# Patient Record
Sex: MALE | Race: WHITE | NOT HISPANIC OR LATINO
[De-identification: names, ages, dates, MRNs, and addresses within clinical notes are randomized per-mention and may not be internally consistent; named-entity substitution may affect disease eponyms.]

---

## 2018-02-14 ENCOUNTER — TRANSCRIPTION ENCOUNTER (OUTPATIENT)
Age: 73
End: 2018-02-14

## 2018-02-15 ENCOUNTER — RESULT REVIEW (OUTPATIENT)
Age: 73
End: 2018-02-15

## 2018-02-15 ENCOUNTER — OUTPATIENT (OUTPATIENT)
Dept: OUTPATIENT SERVICES | Facility: HOSPITAL | Age: 73
LOS: 1 days | End: 2018-02-15
Payer: MEDICARE

## 2018-02-15 VITALS
SYSTOLIC BLOOD PRESSURE: 136 MMHG | HEART RATE: 73 BPM | RESPIRATION RATE: 16 BRPM | DIASTOLIC BLOOD PRESSURE: 64 MMHG | OXYGEN SATURATION: 100 % | HEIGHT: 65.5 IN | WEIGHT: 181.66 LBS | TEMPERATURE: 99 F

## 2018-02-15 VITALS
HEART RATE: 69 BPM | SYSTOLIC BLOOD PRESSURE: 136 MMHG | DIASTOLIC BLOOD PRESSURE: 63 MMHG | RESPIRATION RATE: 21 BRPM | OXYGEN SATURATION: 96 %

## 2018-02-15 DIAGNOSIS — Z98.49 CATARACT EXTRACTION STATUS, UNSPECIFIED EYE: Chronic | ICD-10-CM

## 2018-02-15 DIAGNOSIS — Z98.890 OTHER SPECIFIED POSTPROCEDURAL STATES: Chronic | ICD-10-CM

## 2018-02-15 DIAGNOSIS — T85.398A OTHER MECHANICAL COMPLICATION OF OTHER OCULAR PROSTHETIC DEVICES, IMPLANTS AND GRAFTS, INITIAL ENCOUNTER: ICD-10-CM

## 2018-02-15 PROCEDURE — 67036 REMOVAL OF INNER EYE FLUID: CPT | Mod: LT

## 2018-02-15 PROCEDURE — 88300 SURGICAL PATH GROSS: CPT

## 2018-02-15 PROCEDURE — V2632: CPT

## 2018-02-15 PROCEDURE — 88300 SURGICAL PATH GROSS: CPT | Mod: 26

## 2018-02-15 PROCEDURE — 66986 EXCHANGE LENS PROSTHESIS: CPT | Mod: LT

## 2018-02-15 NOTE — ASU DISCHARGE PLAN (ADULT/PEDIATRIC). - PROCEDURE
Left eye pars plana vitrectomy, removal of dislocated lens, implant insertion new sutured poster Intraocular lens implant(IOL)

## 2018-02-15 NOTE — ASU DISCHARGE PLAN (ADULT/PEDIATRIC). - SPECIAL INSTRUCTIONS
LEAVE PATCH AND SHIELD ON  FOLLOW UP TOMORROW IN OFFICE  CALL OFFICE -[phone] IF NEEDED FOR EMERGENCY

## 2018-02-20 LAB — SURGICAL PATHOLOGY FINAL REPORT - CH: SIGNIFICANT CHANGE UP

## 2018-07-13 PROBLEM — I10 ESSENTIAL (PRIMARY) HYPERTENSION: Chronic | Status: ACTIVE | Noted: 2018-02-15

## 2018-07-13 PROBLEM — N40.0 BENIGN PROSTATIC HYPERPLASIA WITHOUT LOWER URINARY TRACT SYMPTOMS: Chronic | Status: ACTIVE | Noted: 2018-02-15

## 2018-07-13 PROBLEM — E78.5 HYPERLIPIDEMIA, UNSPECIFIED: Chronic | Status: ACTIVE | Noted: 2018-02-15

## 2020-05-13 PROBLEM — Z00.00 ENCOUNTER FOR PREVENTIVE HEALTH EXAMINATION: Status: ACTIVE | Noted: 2020-05-13

## 2020-06-24 ENCOUNTER — APPOINTMENT (OUTPATIENT)
Dept: ORTHOPEDIC SURGERY | Facility: CLINIC | Age: 75
End: 2020-06-24
Payer: MEDICARE

## 2020-06-24 VITALS
DIASTOLIC BLOOD PRESSURE: 78 MMHG | BODY MASS INDEX: 28.12 KG/M2 | WEIGHT: 175 LBS | TEMPERATURE: 98.2 F | HEART RATE: 73 BPM | SYSTOLIC BLOOD PRESSURE: 124 MMHG | HEIGHT: 66 IN

## 2020-06-24 DIAGNOSIS — M21.41 FLAT FOOT [PES PLANUS] (ACQUIRED), RIGHT FOOT: ICD-10-CM

## 2020-06-24 DIAGNOSIS — M21.42 FLAT FOOT [PES PLANUS] (ACQUIRED), RIGHT FOOT: ICD-10-CM

## 2020-06-24 DIAGNOSIS — M25.572 PAIN IN LEFT ANKLE AND JOINTS OF LEFT FOOT: ICD-10-CM

## 2020-06-24 DIAGNOSIS — M76.822 POSTERIOR TIBIAL TENDINITIS, LEFT LEG: ICD-10-CM

## 2020-06-24 DIAGNOSIS — M25.472 EFFUSION, LEFT ANKLE: ICD-10-CM

## 2020-06-24 DIAGNOSIS — G89.29 PAIN IN LEFT ANKLE AND JOINTS OF LEFT FOOT: ICD-10-CM

## 2020-06-24 PROCEDURE — 73610 X-RAY EXAM OF ANKLE: CPT | Mod: LT

## 2020-06-24 PROCEDURE — 99204 OFFICE O/P NEW MOD 45 MIN: CPT

## 2020-06-24 NOTE — PHYSICAL EXAM
[de-identified] : \par Left Ankle Physical Examination:\par \par General: Alert and oriented x3.  In no acute distress.  Pleasant in nature with a normal affect.  No apparent respiratory distress. \par Erythema, Warmth, Rubor: Negative\par Swelling: Positive\par \par *Severe flatfoot deformity noted.\par \par ROM:\par 1. Dorsiflexion: 10 degrees\par 2. Plantarflexion: 40 degrees\par 3. Inversion: 10 degrees\par 4. Eversion: 10 degrees\par \par Tenderness to Palpation: \par 1. Lateral Malleolus: Negative\par 2. Medial Malleolus: Negative\par 3. Proximal Fibular Pain: Negative\par 4. Heel Pain: Negative\par 5. Cuboid: Negative\par 6. Navicular: Negative\par 7. Tibiotalar Joint: Negative\par 8. Subtalar Joint: Negative\par 9. Posterior Recess: Negative\par \par Tendon Pain:\par 1. Achilles: Negative\par 2. Peroneals: Negative\par 3. Posterior Tibialis: Positive\par 4. Tibialis Anterior: Negative\par \par Ligament Pain:\par 1. ATFL: [default value]\par 2. CFL: Negative \par 3. PTFL: Negative\par 4. Deltoid Ligaments: Negative\par 5. Lis Franc Ligament: Negative\par \par Stability: \par 1. Anterior Drawer: Negative\par 2. Posterior Drawer: Negative\par \par Strength: 5/5 TA/GS/EHL\par \par Pulses: 2+ DP/PT Pulses\par \par Neuro: Intact motor and sensory\par \par Additional Test:\par 1. Calcaneal Squeeze Test: Negative\par 2. Syndesmosis Squeeze Test: Negative\par  [de-identified] : X-rays of the left ankle taken in office today and reviewed with the patient showed: Old healed fracture distal fibula. Severe flatfoot deformity noted.

## 2020-06-24 NOTE — DISCUSSION/SUMMARY
[de-identified] : Assessment: Left ankle posterior tibial tendon dysfunction.\par \par Plan:\par #1. ASO brace given.\par #2. Over-the-counter orthotics to start out with. Consider custom orthotics in the future.\par #3. Anti-inflammatories prescribed. Patient can use Tylenol as well for pain.\par #4. Physical therapy.\par #5. Hold off on MRI at this time.\par #6. Follow up in 6-8 weeks. If the patient continues to have pain consider MRI then. All of his questions were answered.

## 2020-06-24 NOTE — HISTORY OF PRESENT ILLNESS
[FreeTextEntry1] : Collin is a 74 y.o. who presents with chronic left ankle pain.  Pain located medial ankle.  Patient has severe flat foot deformity.  Pain scale 7/10.  Patient presents wearing regular sneakers.  Patient does not have orthotics.  No other complaints.

## 2020-09-02 ENCOUNTER — NON-APPOINTMENT (OUTPATIENT)
Age: 75
End: 2020-09-02

## 2020-09-02 ENCOUNTER — APPOINTMENT (OUTPATIENT)
Dept: CARDIOLOGY | Facility: CLINIC | Age: 75
End: 2020-09-02
Payer: MEDICARE

## 2020-09-02 VITALS
HEIGHT: 65 IN | SYSTOLIC BLOOD PRESSURE: 120 MMHG | BODY MASS INDEX: 30.16 KG/M2 | RESPIRATION RATE: 14 BRPM | DIASTOLIC BLOOD PRESSURE: 68 MMHG | WEIGHT: 181 LBS | HEART RATE: 66 BPM

## 2020-09-02 DIAGNOSIS — M54.5 LOW BACK PAIN: ICD-10-CM

## 2020-09-02 DIAGNOSIS — M25.559 PAIN IN UNSPECIFIED HIP: ICD-10-CM

## 2020-09-02 PROCEDURE — 99204 OFFICE O/P NEW MOD 45 MIN: CPT

## 2020-09-02 PROCEDURE — 93000 ELECTROCARDIOGRAM COMPLETE: CPT

## 2020-09-02 RX ORDER — ATORVASTATIN CALCIUM 10 MG/1
10 TABLET, FILM COATED ORAL
Refills: 0 | Status: ACTIVE | COMMUNITY

## 2020-09-02 RX ORDER — TAMSULOSIN HYDROCHLORIDE 0.4 MG/1
0.4 CAPSULE ORAL
Refills: 0 | Status: ACTIVE | COMMUNITY

## 2020-09-25 ENCOUNTER — NON-APPOINTMENT (OUTPATIENT)
Age: 75
End: 2020-09-25

## 2020-10-22 ENCOUNTER — APPOINTMENT (OUTPATIENT)
Dept: CARDIOLOGY | Facility: CLINIC | Age: 75
End: 2020-10-22
Payer: MEDICARE

## 2020-10-22 ENCOUNTER — TRANSCRIPTION ENCOUNTER (OUTPATIENT)
Age: 75
End: 2020-10-22

## 2020-10-22 ENCOUNTER — MED ADMIN CHARGE (OUTPATIENT)
Age: 75
End: 2020-10-22

## 2020-10-22 PROCEDURE — 93015 CV STRESS TEST SUPVJ I&R: CPT

## 2020-10-22 PROCEDURE — 78452 HT MUSCLE IMAGE SPECT MULT: CPT

## 2020-10-22 PROCEDURE — A9500: CPT

## 2020-10-22 RX ADMIN — REGADENOSON 0 MG/5ML: 0.08 INJECTION, SOLUTION INTRAVENOUS at 00:00

## 2020-10-22 RX ADMIN — AMINOPHYLLINE 0 MG/ML: 25 INJECTION, SOLUTION INTRAVENOUS at 00:00

## 2020-10-30 RX ORDER — AMINOPHYLLINE 25 MG/ML
25 INJECTION, SOLUTION INTRAVENOUS
Qty: 0 | Refills: 0 | Status: COMPLETED | OUTPATIENT
Start: 2020-10-22

## 2020-10-30 RX ORDER — REGADENOSON 0.08 MG/ML
0.4 INJECTION, SOLUTION INTRAVENOUS
Qty: 4 | Refills: 0 | Status: COMPLETED | OUTPATIENT
Start: 2020-10-22

## 2020-11-02 RX ORDER — KIT FOR THE PREPARATION OF TECHNETIUM TC99M SESTAMIBI 1 MG/5ML
INJECTION, POWDER, LYOPHILIZED, FOR SOLUTION PARENTERAL
Refills: 0 | Status: COMPLETED | OUTPATIENT
Start: 2020-11-02

## 2020-11-02 RX ADMIN — KIT FOR THE PREPARATION OF TECHNETIUM TC99M SESTAMIBI 0: 1 INJECTION, POWDER, LYOPHILIZED, FOR SOLUTION PARENTERAL at 00:00

## 2020-11-13 ENCOUNTER — APPOINTMENT (OUTPATIENT)
Dept: CARDIOLOGY | Facility: CLINIC | Age: 75
End: 2020-11-13
Payer: MEDICARE

## 2020-11-13 PROCEDURE — 93306 TTE W/DOPPLER COMPLETE: CPT

## 2020-12-08 ENCOUNTER — APPOINTMENT (OUTPATIENT)
Dept: GASTROENTEROLOGY | Facility: CLINIC | Age: 75
End: 2020-12-08
Payer: MEDICARE

## 2020-12-08 DIAGNOSIS — Z12.11 ENCOUNTER FOR SCREENING FOR MALIGNANT NEOPLASM OF COLON: ICD-10-CM

## 2020-12-08 PROCEDURE — 99441: CPT | Mod: 95

## 2020-12-08 NOTE — HISTORY OF PRESENT ILLNESS
[de-identified] : Mr. JARED DOMINGUEZ is a 75 year old male screening colonoscopy. Patient has no complaints of bowel issues, rectal bleeding, abdominal pain, GERD symptoms. Last colonoscopy was in 2010. \par

## 2020-12-08 NOTE — REASON FOR VISIT
[Home] : at home, [unfilled] , at the time of the visit. [Medical Office: ([facility])___] : at the medical office located in  [Verbal consent obtained from patient] : the patient, [unfilled] [Procedure: _________] : a [unfilled] procedure visit

## 2021-01-09 ENCOUNTER — APPOINTMENT (OUTPATIENT)
Dept: DISASTER EMERGENCY | Facility: CLINIC | Age: 76
End: 2021-01-09

## 2021-01-11 LAB — SARS-COV-2 N GENE NPH QL NAA+PROBE: NOT DETECTED

## 2021-01-14 ENCOUNTER — APPOINTMENT (OUTPATIENT)
Dept: GASTROENTEROLOGY | Facility: AMBULATORY MEDICAL SERVICES | Age: 76
End: 2021-01-14
Payer: MEDICARE

## 2021-01-14 PROCEDURE — 45378 DIAGNOSTIC COLONOSCOPY: CPT

## 2021-03-23 ENCOUNTER — APPOINTMENT (OUTPATIENT)
Dept: CARDIOLOGY | Facility: CLINIC | Age: 76
End: 2021-03-23
Payer: MEDICARE

## 2021-03-23 ENCOUNTER — NON-APPOINTMENT (OUTPATIENT)
Age: 76
End: 2021-03-23

## 2021-03-23 VITALS
DIASTOLIC BLOOD PRESSURE: 82 MMHG | TEMPERATURE: 97.3 F | SYSTOLIC BLOOD PRESSURE: 160 MMHG | BODY MASS INDEX: 28.82 KG/M2 | HEART RATE: 67 BPM | WEIGHT: 173 LBS | HEIGHT: 65 IN | RESPIRATION RATE: 14 BRPM

## 2021-03-23 VITALS — DIASTOLIC BLOOD PRESSURE: 70 MMHG | SYSTOLIC BLOOD PRESSURE: 154 MMHG

## 2021-03-23 PROCEDURE — 99214 OFFICE O/P EST MOD 30 MIN: CPT

## 2021-03-23 PROCEDURE — 93000 ELECTROCARDIOGRAM COMPLETE: CPT

## 2021-03-23 RX ORDER — LOSARTAN POTASSIUM 50 MG/1
50 TABLET, FILM COATED ORAL
Refills: 0 | Status: DISCONTINUED | COMMUNITY
End: 2021-03-23

## 2021-03-23 NOTE — HISTORY OF PRESENT ILLNESS
[FreeTextEntry1] : Transthoracic echo from 11/13/20 demonstrated mild aortic root enlargement with preserved LV size and systolic function and normal ejection fraction;  Mild AI and TR;\par \par he has been doing a modest amount of physical activity but did have prior significant  orthopedic procedure for hip placement surgery in the past;

## 2021-03-23 NOTE — PHYSICAL EXAM
[Normal Appearance] : normal appearance [FreeTextEntry1] : Alert, pleasant, well-developed well-nourished white male in no acute distress;\par Alert, well-developed well-nourished white male in no acute distress; [Normal Conjunctiva] : the conjunctiva exhibited no abnormalities [Eyelids - No Xanthelasma] : the eyelids demonstrated no xanthelasmas [Normal Oral Mucosa] : normal oral mucosa [No Oral Pallor] : no oral pallor [No Oral Cyanosis] : no oral cyanosis [Normal Jugular Venous A Waves Present] : normal jugular venous A waves present [Normal Jugular Venous V Waves Present] : normal jugular venous V waves present [No Jugular Venous Zayas A Waves] : no jugular venous zayas A waves [Respiration, Rhythm And Depth] : normal respiratory rhythm and effort [Exaggerated Use Of Accessory Muscles For Inspiration] : no accessory muscle use [Auscultation Breath Sounds / Voice Sounds] : lungs were clear to auscultation bilaterally [Heart Rate And Rhythm] : heart rate and rhythm were normal [Heart Sounds] : normal S1 and S2 [Murmurs] : no murmurs present [Abdomen Soft] : soft [Abdomen Tenderness] : non-tender [Abdomen Mass (___ Cm)] : no abdominal mass palpated [Abnormal Walk] : normal gait [Gait - Sufficient For Exercise Testing] : the gait was sufficient for exercise testing [Nail Clubbing] : no clubbing of the fingernails [Cyanosis, Localized] : no localized cyanosis [Petechial Hemorrhages (___cm)] : no petechial hemorrhages [Skin Color & Pigmentation] : normal skin color and pigmentation [] : no rash [No Venous Stasis] : no venous stasis [Skin Lesions] : no skin lesions [No Skin Ulcers] : no skin ulcer [No Xanthoma] : no  xanthoma was observed [Oriented To Time, Place, And Person] : oriented to person, place, and time [Affect] : the affect was normal [Mood] : the mood was normal [Memory Recent] : recent memory was not impaired [No Anxiety] : not feeling anxious

## 2021-03-23 NOTE — REASON FOR VISIT
[Follow-Up - Clinic] : a clinic follow-up of [FreeTextEntry1] : The patient is a 75-year-old white male retired  who presents back to the office today for general cardiac checkup.;\par \par He has a known history of abnormal EKG (LBBB), and hypertension and underwent cardiac testing last fall including a pharmacologic myocardial perfusion stress test; (normal myocardial perfusion with normal LVEF-10/22/20);\par \par He presents back to the office for general cardiac checkup\par \par Patient reports that the vas majority of the time his blood pressure is much better at home and in the normal range\par \par He's been asymptomatic for chest pain, shortness of breath, palpitations or dizziness;;;

## 2021-03-23 NOTE — ASSESSMENT
[FreeTextEntry1] : EKG demonstrating normal sinus rhythm at a rate of 67 with complete left bundle branch block pattern;\par \par In summary this 75-year-old gentleman has a history of abnormal EKG and hypertension which appears to show some mild systolic hypertension today in the office, but otherwise stable cardiac pattern and recent cardial perfusion stress test negative for ischemia;\par \par \par Plan:\par \par Counseled patient on adhering to low-sodium diet and encouraged ongoing nutritional weight loss plan;\par \par Return to office within 5 months or p.r.n.\par \par No additional cardiac workup indicated at this time\par \par Continued timely checkups and laboratory blood tests with primary care and for;;;\par

## 2021-09-29 ENCOUNTER — APPOINTMENT (OUTPATIENT)
Dept: CARDIOLOGY | Facility: CLINIC | Age: 76
End: 2021-09-29
Payer: MEDICARE

## 2021-09-29 ENCOUNTER — NON-APPOINTMENT (OUTPATIENT)
Age: 76
End: 2021-09-29

## 2021-09-29 VITALS
DIASTOLIC BLOOD PRESSURE: 70 MMHG | SYSTOLIC BLOOD PRESSURE: 141 MMHG | HEART RATE: 68 BPM | HEIGHT: 65 IN | RESPIRATION RATE: 14 BRPM | WEIGHT: 179 LBS | BODY MASS INDEX: 29.82 KG/M2

## 2021-09-29 DIAGNOSIS — R05 COUGH: ICD-10-CM

## 2021-09-29 DIAGNOSIS — R09.89 OTHER SPECIFIED SYMPTOMS AND SIGNS INVOLVING THE CIRCULATORY AND RESPIRATORY SYSTEMS: ICD-10-CM

## 2021-09-29 PROCEDURE — 93000 ELECTROCARDIOGRAM COMPLETE: CPT

## 2021-09-29 PROCEDURE — 99214 OFFICE O/P EST MOD 30 MIN: CPT

## 2021-09-29 RX ORDER — ATORVASTATIN CALCIUM 20 MG/1
20 TABLET, FILM COATED ORAL
Qty: 90 | Refills: 0 | Status: DISCONTINUED | COMMUNITY
Start: 2020-07-29 | End: 2021-09-29

## 2021-09-29 RX ORDER — SODIUM SULFATE, POTASSIUM SULFATE, MAGNESIUM SULFATE 17.5; 3.13; 1.6 G/ML; G/ML; G/ML
17.5-3.13-1.6 SOLUTION, CONCENTRATE ORAL
Qty: 1 | Refills: 0 | Status: DISCONTINUED | COMMUNITY
Start: 2020-12-08 | End: 2021-09-29

## 2021-09-30 NOTE — REASON FOR VISIT
[Arrhythmia/ECG Abnorrmalities] : arrhythmia/ECG abnormalities [Structural Heart and Valve Disease] : structural heart and valve disease [Hyperlipidemia] : hyperlipidemia [Hypertension] : hypertension [Spouse] : spouse [FreeTextEntry3] : N/A [FreeTextEntry1] : The patient is a 76-year-old white male retired  who presents back to the office for general cardiac checkup today;\par \par Patient has a known history of abnormal EKG (LVEDP\par He has been treated for hypertension and hyperlipidemia;\par \par Overall, patient states he has been generally feeling well over the past several months and has had no significant chest pain, shortness of breath, palpitations or dizziness;;

## 2021-09-30 NOTE — HISTORY OF PRESENT ILLNESS
[FreeTextEntry1] : He had cardiac testing including echocardiogram November 2020 which showed preserved LV size and systolic function, normal LVEF and mild AI and TR;\par \par His most recent myocardial perfusion stress test was October 2020 and demonstrated with pharmacologic protocol normal myocardial perfusion;\par \par He is tolerating current medical regimen without difficulty;

## 2021-09-30 NOTE — PHYSICAL EXAM
[Well Developed] : well developed [Well Nourished] : well nourished [No Acute Distress] : no acute distress [Normal Conjunctiva] : normal conjunctiva [Normal Venous Pressure] : normal venous pressure [No Carotid Bruit] : no carotid bruit [Normal S1, S2] : normal S1, S2 [No Rub] : no rub [No Gallop] : no gallop [Clear Lung Fields] : clear lung fields [Good Air Entry] : good air entry [No Respiratory Distress] : no respiratory distress  [Soft] : abdomen soft [Non Tender] : non-tender [No Masses/organomegaly] : no masses/organomegaly [Normal Bowel Sounds] : normal bowel sounds [Normal Gait] : normal gait [No Edema] : no edema [No Cyanosis] : no cyanosis [No Clubbing] : no clubbing [No Varicosities] : no varicosities [No Rash] : no rash [No Skin Lesions] : no skin lesions [Moves all extremities] : moves all extremities [No Focal Deficits] : no focal deficits [Normal Speech] : normal speech [Alert and Oriented] : alert and oriented [Normal memory] : normal memory [de-identified] : regular rhythm, grade 1-2/6 systolic murmur;

## 2021-09-30 NOTE — ASSESSMENT
[FreeTextEntry1] : EKG demonstrated normal sinus rhythm at a rate of 68. There is complete left bundle branch block pattern with slight left axis deviation;\par \par In summary this 76-year-old gentleman has had a history for abnormal EKG, hyperlipidemia and hypertension which have been under reasonable control on current medical regimen\par He has had a stable cardiac pattern, and recent cardiac testing;\par \par Plan:\par \par No additional cardiac workup indicated at this time\par \par Patient encouraged to continue modest physical walking exercise regimen\par \par Return to office within 5-6 months or p.r.n.;\par \par Continue current medical regimen;\par \par ;;;

## 2021-12-08 ENCOUNTER — APPOINTMENT (OUTPATIENT)
Dept: CARDIOLOGY | Facility: CLINIC | Age: 76
End: 2021-12-08
Payer: MEDICARE

## 2021-12-08 PROCEDURE — 93880 EXTRACRANIAL BILAT STUDY: CPT

## 2022-01-04 ENCOUNTER — TRANSCRIPTION ENCOUNTER (OUTPATIENT)
Age: 77
End: 2022-01-04

## 2022-01-20 ENCOUNTER — APPOINTMENT (OUTPATIENT)
Dept: CARDIOLOGY | Facility: CLINIC | Age: 77
End: 2022-01-20
Payer: MEDICARE

## 2022-01-20 ENCOUNTER — NON-APPOINTMENT (OUTPATIENT)
Age: 77
End: 2022-01-20

## 2022-01-20 VITALS
HEIGHT: 65 IN | DIASTOLIC BLOOD PRESSURE: 79 MMHG | BODY MASS INDEX: 29.99 KG/M2 | SYSTOLIC BLOOD PRESSURE: 128 MMHG | WEIGHT: 180 LBS | HEART RATE: 67 BPM | RESPIRATION RATE: 14 BRPM

## 2022-01-20 PROCEDURE — 93000 ELECTROCARDIOGRAM COMPLETE: CPT

## 2022-01-20 PROCEDURE — 99214 OFFICE O/P EST MOD 30 MIN: CPT

## 2022-01-20 NOTE — HISTORY OF PRESENT ILLNESS
[FreeTextEntry1] : He has been tolerating his current medical regimen without difficulty;\par \par The patient is a modest amount of physical activity and exercise without difficulty;\par \par Nuclear-pharmacologic stress test performed October 2020 showed normal myocardial perfusion without any evidence of ischemia-i.e. negative test;\par \par most recent transthoracic echocardiogram from November 2020 showed preserved LV size and systolic function with normal ejection fraction 60%. Mild AI and TR;

## 2022-01-20 NOTE — REASON FOR VISIT
[Arrhythmia/ECG Abnorrmalities] : arrhythmia/ECG abnormalities [Structural Heart and Valve Disease] : structural heart and valve disease [Hyperlipidemia] : hyperlipidemia [Hypertension] : hypertension [Spouse] : spouse [FreeTextEntry3] : N/A [FreeTextEntry1] : The patient is a 76-year-old white male retired  who presents back to the office for general cardiac checkup and in anticipation of cardiac clearance to undergo eye surgery January 27, 2022 with Dr. David Frost;\par \par He has a history of mild hypertension and hyperlipidemia, as well as abnormal EKG (LBBB);\par \par Patient reports he's been feeling well from a cardiac standpoint without chest pain, shortness of breath, palpitations or dizziness;

## 2022-01-20 NOTE — PHYSICAL EXAM
[Well Developed] : well developed [Well Nourished] : well nourished [No Acute Distress] : no acute distress [Normal Conjunctiva] : normal conjunctiva [Normal Venous Pressure] : normal venous pressure [No Carotid Bruit] : no carotid bruit [Normal S1, S2] : normal S1, S2 [No Rub] : no rub [No Gallop] : no gallop [Clear Lung Fields] : clear lung fields [Good Air Entry] : good air entry [No Respiratory Distress] : no respiratory distress  [Soft] : abdomen soft [Non Tender] : non-tender [No Masses/organomegaly] : no masses/organomegaly [Normal Bowel Sounds] : normal bowel sounds [Normal Gait] : normal gait [No Edema] : no edema [No Cyanosis] : no cyanosis [No Clubbing] : no clubbing [No Varicosities] : no varicosities [No Rash] : no rash [No Skin Lesions] : no skin lesions [Moves all extremities] : moves all extremities [No Focal Deficits] : no focal deficits [Normal Speech] : normal speech [Alert and Oriented] : alert and oriented [Normal memory] : normal memory [de-identified] : regular rhythm, grade 1/6 systolic murmur;

## 2022-01-20 NOTE — ASSESSMENT
[FreeTextEntry1] : EKG shows normal sinus rhythm at a rate of 67; complete left bundle branch block pattern. Essentially unchanged;\par \par carotid duplex study from December 8, 2021 shows minimal calcified plaque on the left with mild heterogeneous plaquing on the right side was otherwise normal flow bilaterally;\par \par In summary this 76-year-old gentleman is facing eye surgery in the near future; he has had a history of LBBB on EKG but normal myocardial perfusion stress test and stable echocardiogram in the recent;\par He's had a stable cardiac pattern;\par \par Plan:\par \par There is no absolute cardiac contraindication to undergo the proposed surgical procedure;\par \par Patient recommended to followup to office within 5 months or p.r.n.;\par \par Continue regular checkups with primary care and encouraged;

## 2022-04-06 ENCOUNTER — NON-APPOINTMENT (OUTPATIENT)
Age: 77
End: 2022-04-06

## 2022-04-07 ENCOUNTER — NON-APPOINTMENT (OUTPATIENT)
Age: 77
End: 2022-04-07

## 2022-04-07 ENCOUNTER — APPOINTMENT (OUTPATIENT)
Dept: OPHTHALMOLOGY | Facility: CLINIC | Age: 77
End: 2022-04-07
Payer: MEDICARE

## 2022-04-07 PROCEDURE — 92002 INTRM OPH EXAM NEW PATIENT: CPT

## 2022-04-07 PROCEDURE — 92285 EXTERNAL OCULAR PHOTOGRAPHY: CPT

## 2022-04-14 ENCOUNTER — NON-APPOINTMENT (OUTPATIENT)
Age: 77
End: 2022-04-14

## 2022-04-14 ENCOUNTER — APPOINTMENT (OUTPATIENT)
Dept: CARDIOLOGY | Facility: CLINIC | Age: 77
End: 2022-04-14
Payer: MEDICARE

## 2022-04-14 VITALS — DIASTOLIC BLOOD PRESSURE: 74 MMHG | SYSTOLIC BLOOD PRESSURE: 148 MMHG

## 2022-04-14 VITALS
SYSTOLIC BLOOD PRESSURE: 150 MMHG | RESPIRATION RATE: 14 BRPM | DIASTOLIC BLOOD PRESSURE: 80 MMHG | WEIGHT: 183 LBS | BODY MASS INDEX: 30.49 KG/M2 | HEART RATE: 75 BPM | HEIGHT: 65 IN

## 2022-04-14 DIAGNOSIS — Z01.818 ENCOUNTER FOR OTHER PREPROCEDURAL EXAMINATION: ICD-10-CM

## 2022-04-14 PROCEDURE — 99213 OFFICE O/P EST LOW 20 MIN: CPT

## 2022-04-14 PROCEDURE — 93000 ELECTROCARDIOGRAM COMPLETE: CPT

## 2022-04-14 RX ORDER — MELOXICAM 15 MG/1
15 TABLET ORAL
Qty: 30 | Refills: 2 | Status: DISCONTINUED | COMMUNITY
Start: 2020-06-24 | End: 2022-04-14

## 2022-04-14 NOTE — ASSESSMENT
[FreeTextEntry1] : EKG shows normal sinus rhythm at a rate of 75; complete left bundle branch block pattern. Essentially unchanged;\par \par carotid duplex study from December 8, 2021 shows minimal calcified plaque on the left with mild heterogeneous plaquing on the right side was otherwise normal flow bilaterally;\par \par In summary this 76-year-old gentleman is facing eye surgery in the near future; he has had a history of LBBB on EKG but normal myocardial perfusion stress test and stable echocardiogram in the recent;\par He's had a stable cardiac pattern;

## 2022-04-14 NOTE — REASON FOR VISIT
[FreeTextEntry1] : JARED DOMINUGEZ is being seen for arrhythmia/ECG abnormalities, structural heart and valve disease, hyperlipidemia and hypertension. Patient accompanied by spouse. \par \par The patient is a 76-year-old white male retired  who presents back to the office for general cardiac checkup and in anticipation of cardiac clearance to undergo eye surgery May 9, 2022 with Dr. She Kumar at [facility];\par \par He has a history of mild hypertension and hyperlipidemia, as well as abnormal EKG (LBBB);\par \par Patient reports he's been feeling well from a cardiac standpoint without chest pain, shortness of breath, palpitations or dizziness;

## 2022-04-14 NOTE — PHYSICAL EXAM
[Well Developed] : well developed [No Acute Distress] : no acute distress [Obese] : obese [Normal Conjunctiva] : normal conjunctiva [Normal Venous Pressure] : normal venous pressure [No Carotid Bruit] : no carotid bruit [Normal S1, S2] : normal S1, S2 [No Rub] : no rub [No Gallop] : no gallop [Murmur] : murmur [Clear Lung Fields] : clear lung fields [Good Air Entry] : good air entry [No Respiratory Distress] : no respiratory distress  [Soft] : abdomen soft [Non Tender] : non-tender [No Masses/organomegaly] : no masses/organomegaly [Normal Gait] : normal gait [No Edema] : no edema [No Cyanosis] : no cyanosis [No Clubbing] : no clubbing [No Rash] : no rash [No Skin Lesions] : no skin lesions [Moves all extremities] : moves all extremities [No Focal Deficits] : no focal deficits [Normal Speech] : normal speech [Alert and Oriented] : alert and oriented [Normal memory] : normal memory [de-identified] : Grade I/VI systolic murmur

## 2022-04-14 NOTE — DISCUSSION/SUMMARY
[FreeTextEntry1] : There is no absolute cardiac contraindication to undergo the proposed surgical procedure;\par \par Patient recommended to followup with Dr. Braun on August 11th or p.r.n.;\par \par Continue regular checkups with primary care and encouraged;\par \par If I may be of additional assistance, please do not hesitate to call.

## 2022-05-06 ENCOUNTER — OUTPATIENT (OUTPATIENT)
Dept: OUTPATIENT SERVICES | Facility: HOSPITAL | Age: 77
LOS: 1 days | End: 2022-05-06
Payer: MEDICARE

## 2022-05-06 DIAGNOSIS — Z98.49 CATARACT EXTRACTION STATUS, UNSPECIFIED EYE: Chronic | ICD-10-CM

## 2022-05-06 DIAGNOSIS — Z98.890 OTHER SPECIFIED POSTPROCEDURAL STATES: Chronic | ICD-10-CM

## 2022-05-06 DIAGNOSIS — Z20.828 CONTACT WITH AND (SUSPECTED) EXPOSURE TO OTHER VIRAL COMMUNICABLE DISEASES: ICD-10-CM

## 2022-05-06 LAB — SARS-COV-2 RNA SPEC QL NAA+PROBE: SIGNIFICANT CHANGE UP

## 2022-05-06 PROCEDURE — U0005: CPT

## 2022-05-06 PROCEDURE — U0003: CPT

## 2022-05-06 NOTE — ASU PATIENT PROFILE, ADULT - FALL HARM RISK - HARM RISK INTERVENTIONS

## 2022-05-06 NOTE — ASU PATIENT PROFILE, ADULT - NS TRANSFER PATIENT BELONGINGS
yellow tone wedding band taped, pt unable to remove/Cell Phone/PDA (specify)/Jewelry/Money (specify)

## 2022-05-06 NOTE — ASU PATIENT PROFILE, ADULT - NSICDXPASTMEDICALHX_GEN_ALL_CORE_FT
PAST MEDICAL HISTORY:  Benign prostatic hypertrophy     HLD (hyperlipidemia)     HTN (hypertension)

## 2022-05-08 ENCOUNTER — TRANSCRIPTION ENCOUNTER (OUTPATIENT)
Age: 77
End: 2022-05-08

## 2022-05-09 ENCOUNTER — TRANSCRIPTION ENCOUNTER (OUTPATIENT)
Age: 77
End: 2022-05-09

## 2022-05-09 ENCOUNTER — APPOINTMENT (OUTPATIENT)
Dept: OPHTHALMOLOGY | Facility: HOSPITAL | Age: 77
End: 2022-05-09

## 2022-05-09 ENCOUNTER — OUTPATIENT (OUTPATIENT)
Dept: OUTPATIENT SERVICES | Facility: HOSPITAL | Age: 77
LOS: 1 days | End: 2022-05-09
Payer: MEDICARE

## 2022-05-09 VITALS
HEIGHT: 64.75 IN | SYSTOLIC BLOOD PRESSURE: 159 MMHG | TEMPERATURE: 98 F | WEIGHT: 181.22 LBS | RESPIRATION RATE: 18 BRPM | DIASTOLIC BLOOD PRESSURE: 75 MMHG | HEART RATE: 70 BPM | OXYGEN SATURATION: 98 %

## 2022-05-09 VITALS
HEART RATE: 69 BPM | DIASTOLIC BLOOD PRESSURE: 71 MMHG | SYSTOLIC BLOOD PRESSURE: 159 MMHG | OXYGEN SATURATION: 97 % | RESPIRATION RATE: 16 BRPM

## 2022-05-09 DIAGNOSIS — Z98.49 CATARACT EXTRACTION STATUS, UNSPECIFIED EYE: Chronic | ICD-10-CM

## 2022-05-09 DIAGNOSIS — T81.32XA DISRUPTION OF INTERNAL OPERATION (SURGICAL) WOUND, NOT ELSEWHERE CLASSIFIED, INITIAL ENCOUNTER: ICD-10-CM

## 2022-05-09 DIAGNOSIS — Z98.890 OTHER SPECIFIED POSTPROCEDURAL STATES: Chronic | ICD-10-CM

## 2022-05-09 DIAGNOSIS — Z96.643 PRESENCE OF ARTIFICIAL HIP JOINT, BILATERAL: Chronic | ICD-10-CM

## 2022-05-09 PROCEDURE — 67255 REINFORCE/GRAFT EYE WALL: CPT | Mod: RT

## 2022-05-09 PROCEDURE — V2785: CPT

## 2022-05-09 PROCEDURE — 87070 CULTURE OTHR SPECIMN AEROBIC: CPT

## 2022-05-09 PROCEDURE — 68320 REVISE/GRAFT EYELID LINING: CPT | Mod: RT

## 2022-05-09 PROCEDURE — 65756 CORNEAL TRNSPL ENDOTHELIAL: CPT | Mod: RT

## 2022-05-09 DEVICE — IMPLANTABLE DEVICE: Type: IMPLANTABLE DEVICE | Site: RIGHT | Status: FUNCTIONAL

## 2022-05-09 NOTE — ASU DISCHARGE PLAN (ADULT/PEDIATRIC) - NS MD DC FALL RISK RISK
For information on Fall & Injury Prevention, visit: [email or web address] OR  [email or web address] OR  https://www.cdc.gov/steadi/patient.html

## 2022-05-09 NOTE — ASU DISCHARGE PLAN (ADULT/PEDIATRIC) - CALL YOUR DOCTOR IF YOU HAVE ANY OF THE FOLLOWING:
Bleeding that does not stop/Pain not relieved by Medications/Fever greater than (need to indicate Fahrenheit or Celsius) Bleeding that does not stop/Pain not relieved by Medications/Fever greater than (need to indicate Fahrenheit or Celsius)/Nausea and vomiting that does not stop

## 2022-05-09 NOTE — ASU DISCHARGE PLAN (ADULT/PEDIATRIC) - CARE PROVIDER_API CALL
She Pleitez (DO)  Ophthalmology  [address], [address] [place] [address]  Phone: [phone]  Fax: [phone]  Follow Up Time:

## 2022-05-09 NOTE — ASU DISCHARGE PLAN (ADULT/PEDIATRIC) - ASU DC SPECIAL INSTRUCTIONSFT
Please keep the patch and shield on until your appointment tomorrow. No heavy lifting, sudden bending, or straining. You can take Tylenol for pain. Follow-up tomorrow.

## 2022-05-09 NOTE — ASU PREOP CHECKLIST - TEMPERATURE IN FAHRENHEIT (DEGREES F)
Chart reviewed - patient briefly in the ICU for postpartum hemorrhage. She has now returned to the 4th floor; baby in room. SW met with patient and . Patient states that she experienced the \"normal blues\" with her first and third daughters. She reports experiencing postpartum depression with her second daughter, but this may be due to moving and not having a solid support system. Patient reports that her moods have been appropriate during this pregnancy with no depression/anxiety. Patient states that she and her  have a strong support system. Patient given informational packet on  mood & anxiety disorders (resources/education). Family denies any additional needs from  at this time. Family has 's contact information should any needs/questions arise.     SHIRA Aponte-KAYLA  [address] [facility]   [phone]

## 2022-05-10 ENCOUNTER — NON-APPOINTMENT (OUTPATIENT)
Age: 77
End: 2022-05-10

## 2022-05-10 ENCOUNTER — APPOINTMENT (OUTPATIENT)
Dept: OPHTHALMOLOGY | Facility: CLINIC | Age: 77
End: 2022-05-10
Payer: MEDICARE

## 2022-05-10 PROCEDURE — 99024 POSTOP FOLLOW-UP VISIT: CPT

## 2022-05-17 ENCOUNTER — APPOINTMENT (OUTPATIENT)
Dept: OPHTHALMOLOGY | Facility: CLINIC | Age: 77
End: 2022-05-17
Payer: MEDICARE

## 2022-05-17 ENCOUNTER — NON-APPOINTMENT (OUTPATIENT)
Age: 77
End: 2022-05-17

## 2022-05-17 PROCEDURE — 99024 POSTOP FOLLOW-UP VISIT: CPT

## 2022-05-30 LAB
CULTURE RESULTS: SIGNIFICANT CHANGE UP
SPECIMEN SOURCE: SIGNIFICANT CHANGE UP

## 2022-06-07 ENCOUNTER — APPOINTMENT (OUTPATIENT)
Dept: OPHTHALMOLOGY | Facility: CLINIC | Age: 77
End: 2022-06-07
Payer: MEDICARE

## 2022-06-07 ENCOUNTER — NON-APPOINTMENT (OUTPATIENT)
Age: 77
End: 2022-06-07

## 2022-06-07 PROCEDURE — 92285 EXTERNAL OCULAR PHOTOGRAPHY: CPT

## 2022-06-07 PROCEDURE — 99024 POSTOP FOLLOW-UP VISIT: CPT

## 2022-08-09 LAB
ANION GAP SERPL CALC-SCNC: 8 MMOL/L
BASOPHILS # BLD AUTO: 0.04 K/UL
BASOPHILS NFR BLD AUTO: 0.9 %
BUN SERPL-MCNC: 22 MG/DL
CALCIUM SERPL-MCNC: 9.1 MG/DL
CHLORIDE SERPL-SCNC: 107 MMOL/L
CHOLEST SERPL-MCNC: 144 MG/DL
CO2 SERPL-SCNC: 27 MMOL/L
CREAT SERPL-MCNC: 0.97 MG/DL
EGFR: 82 ML/MIN/1.73M2
EOSINOPHIL # BLD AUTO: 0.1 K/UL
EOSINOPHIL NFR BLD AUTO: 2.2 %
GLUCOSE SERPL-MCNC: 107 MG/DL
HCT VFR BLD CALC: 38 %
HDLC SERPL-MCNC: 52 MG/DL
HGB BLD-MCNC: 13 G/DL
IMM GRANULOCYTES NFR BLD AUTO: 0.2 %
LDLC SERPL CALC-MCNC: 77 MG/DL
LYMPHOCYTES # BLD AUTO: 1.21 K/UL
LYMPHOCYTES NFR BLD AUTO: 27 %
MAN DIFF?: NORMAL
MCHC RBC-ENTMCNC: 33.6 PG
MCHC RBC-ENTMCNC: 34.2 GM/DL
MCV RBC AUTO: 98.2 FL
MONOCYTES # BLD AUTO: 0.53 K/UL
MONOCYTES NFR BLD AUTO: 11.8 %
NEUTROPHILS # BLD AUTO: 2.59 K/UL
NEUTROPHILS NFR BLD AUTO: 57.9 %
NONHDLC SERPL-MCNC: 92 MG/DL
PLATELET # BLD AUTO: 146 K/UL
POTASSIUM SERPL-SCNC: 5 MMOL/L
PSA SERPL-MCNC: 9.07 NG/ML
RBC # BLD: 3.87 M/UL
RBC # FLD: 13 %
SODIUM SERPL-SCNC: 141 MMOL/L
TRIGL SERPL-MCNC: 77 MG/DL
TSH SERPL-ACNC: 1.99 UIU/ML
WBC # FLD AUTO: 4.48 K/UL

## 2022-08-11 ENCOUNTER — APPOINTMENT (OUTPATIENT)
Dept: CARDIOLOGY | Facility: CLINIC | Age: 77
End: 2022-08-11

## 2022-08-11 ENCOUNTER — NON-APPOINTMENT (OUTPATIENT)
Age: 77
End: 2022-08-11

## 2022-08-11 VITALS
DIASTOLIC BLOOD PRESSURE: 70 MMHG | RESPIRATION RATE: 16 BRPM | BODY MASS INDEX: 30.82 KG/M2 | WEIGHT: 185 LBS | SYSTOLIC BLOOD PRESSURE: 140 MMHG | HEIGHT: 65 IN | HEART RATE: 72 BPM

## 2022-08-11 PROCEDURE — 99214 OFFICE O/P EST MOD 30 MIN: CPT

## 2022-08-11 PROCEDURE — 93000 ELECTROCARDIOGRAM COMPLETE: CPT

## 2022-08-22 NOTE — PHYSICAL EXAM
[Well Developed] : well developed [Well Nourished] : well nourished [No Acute Distress] : no acute distress [Obese] : obese [Normal Conjunctiva] : normal conjunctiva [Normal Venous Pressure] : normal venous pressure [No Carotid Bruit] : no carotid bruit [Normal S1, S2] : normal S1, S2 [No Murmur] : no murmur [No Rub] : no rub [No Gallop] : no gallop [Murmur] : murmur [Clear Lung Fields] : clear lung fields [Good Air Entry] : good air entry [No Respiratory Distress] : no respiratory distress  [Soft] : abdomen soft [Non Tender] : non-tender [No Masses/organomegaly] : no masses/organomegaly [Normal Bowel Sounds] : normal bowel sounds [Normal Gait] : normal gait [No Edema] : no edema [No Cyanosis] : no cyanosis [No Clubbing] : no clubbing [No Varicosities] : no varicosities [No Rash] : no rash [No Skin Lesions] : no skin lesions [Moves all extremities] : moves all extremities [No Focal Deficits] : no focal deficits [Normal Speech] : normal speech [Alert and Oriented] : alert and oriented [Normal memory] : normal memory [de-identified] : Grade I/VI systolic murmur

## 2022-08-22 NOTE — ASSESSMENT
[FreeTextEntry1] : EKG shows normal sinus rhythm at a rate of 72; complete left bundle branch block pattern. Essentially unchanged;\par \par carotid duplex study from December 8, 2021 shows minimal calcified plaque on the left with mild heterogeneous plaquing on the right side was otherwise normal flow bilaterally;\par \par In summary this 76-year-old gentleman with  history of LBBB on EKG but normal myocardial perfusion stress test and stable echocardiogram in the recent;\par \par He's had a stable cardiac pattern;

## 2022-08-22 NOTE — REASON FOR VISIT
[FreeTextEntry1] : JARED DOMINGUEZ is being seen for arrhythmia/ECG abnormalities, structural heart and valve disease, hyperlipidemia and hypertension. Patient accompanied by spouse. \par \par The patient is a 76-year-old white male retired  who presents back to the office for general cardiac checkup; he is accompanied with his wife who is also a patient;\par \par He has a history of mild hypertension and hyperlipidemia, as well as abnormal EKG (LBBB);\par \par Patient reports he's been feeling well from a cardiac standpoint without chest pain, shortness of breath, palpitations or dizziness;

## 2022-08-22 NOTE — HISTORY OF PRESENT ILLNESS
[FreeTextEntry1] : He has been tolerating his current medical regimen without difficulty;\par \par He was cleared for eye procedure back in April 2022 which apparently he has recuperated from;\par \par The patient is a modest amount of physical activity and exercise without difficulty;\par \par Nuclear-pharmacologic stress test performed October 2020 showed normal myocardial perfusion without any evidence of ischemia-i.e. negative test;\par \par most recent transthoracic echocardiogram from November 2020 showed preserved LV size and systolic function with normal ejection fraction 60%. Mild AI and TR;

## 2022-08-22 NOTE — DISCUSSION/SUMMARY
[FreeTextEntry1] : Plan\par \par No additional cardiac work-up indicated at this time; okay to continue current medical regimen;\par \par Continue regular checkups with primary care  encouraged;\par \par Modest walking physical exercise encouraged;\par \par Return to office within 5 to 6 months or as needed;

## 2022-09-13 ENCOUNTER — NON-APPOINTMENT (OUTPATIENT)
Age: 77
End: 2022-09-13

## 2022-09-13 ENCOUNTER — APPOINTMENT (OUTPATIENT)
Dept: OPHTHALMOLOGY | Facility: CLINIC | Age: 77
End: 2022-09-13

## 2022-09-13 PROCEDURE — 92012 INTRM OPH EXAM EST PATIENT: CPT

## 2023-01-31 ENCOUNTER — NON-APPOINTMENT (OUTPATIENT)
Age: 78
End: 2023-01-31

## 2023-01-31 ENCOUNTER — APPOINTMENT (OUTPATIENT)
Dept: CARDIOLOGY | Facility: CLINIC | Age: 78
End: 2023-01-31
Payer: MEDICARE

## 2023-01-31 VITALS
DIASTOLIC BLOOD PRESSURE: 70 MMHG | HEIGHT: 65 IN | SYSTOLIC BLOOD PRESSURE: 128 MMHG | HEART RATE: 67 BPM | BODY MASS INDEX: 29.82 KG/M2 | WEIGHT: 179 LBS | RESPIRATION RATE: 16 BRPM

## 2023-01-31 PROCEDURE — 99214 OFFICE O/P EST MOD 30 MIN: CPT

## 2023-01-31 PROCEDURE — 93000 ELECTROCARDIOGRAM COMPLETE: CPT

## 2023-01-31 NOTE — REASON FOR VISIT
[FreeTextEntry1] : JARED DOMINGUEZ is being seen for arrhythmia/ECG abnormalities, structural heart and valve disease, hyperlipidemia and hypertension. Patient accompanied by spouse. \par \par The patient is a 77-year-old white male retired  who presents back to the office for general cardiac checkup; he is accompanied with his wife who is also a patient;\par \par He has a history of mild hypertension and hyperlipidemia, as well as abnormal EKG (LBBB);\par \par Patient reports he's been feeling well from a cardiac standpoint without chest pain, shortness of breath, palpitations or dizziness;

## 2023-01-31 NOTE — HISTORY OF PRESENT ILLNESS
[FreeTextEntry1] : He has been tolerating his current medical regimen without difficulty;\par \par He was cleared for eye lens implant procedure back in April 2022 which apparently he has recuperated from;\par \par The patient does modest amount of physical activity and exercise without difficulty;\par \par Nuclear-pharmacologic stress test performed October 2020 showed normal myocardial perfusion without any evidence of ischemia-i.e. negative test;\par \par Most recent transthoracic echocardiogram from November 2020 showed preserved LV size and systolic function with normal ejection fraction 60%. Mild AI and TR;

## 2023-01-31 NOTE — ASSESSMENT
[FreeTextEntry1] : EKG shows normal sinus rhythm at a rate of 67; complete left bundle branch block pattern. Essentially unchanged;\par \par carotid duplex study from December 8, 2021 shows minimal calcified plaque on the left with mild heterogeneous plaquing on the right side was otherwise normal flow bilaterally;\par \par \par In summary this 77-year-old gentleman with  history of LBBB on EKG but normal myocardial perfusion stress test and stable echocardiogram in the recent;\par \par He's had a stable cardiac pattern;  and stable blood pressure;

## 2023-01-31 NOTE — PHYSICAL EXAM
[Well Developed] : well developed [Well Nourished] : well nourished [No Acute Distress] : no acute distress [Obese] : obese [Normal Conjunctiva] : normal conjunctiva [Normal Venous Pressure] : normal venous pressure [No Carotid Bruit] : no carotid bruit [Normal S1, S2] : normal S1, S2 [No Murmur] : no murmur [No Rub] : no rub [No Gallop] : no gallop [Murmur] : murmur [Clear Lung Fields] : clear lung fields [Good Air Entry] : good air entry [No Respiratory Distress] : no respiratory distress  [Soft] : abdomen soft [Non Tender] : non-tender [No Masses/organomegaly] : no masses/organomegaly [Normal Bowel Sounds] : normal bowel sounds [Normal Gait] : normal gait [No Edema] : no edema [No Cyanosis] : no cyanosis [No Clubbing] : no clubbing [No Varicosities] : no varicosities [No Rash] : no rash [No Skin Lesions] : no skin lesions [Moves all extremities] : moves all extremities [No Focal Deficits] : no focal deficits [Normal Speech] : normal speech [Alert and Oriented] : alert and oriented [Normal memory] : normal memory [de-identified] : Grade I/VI systolic murmur

## 2023-04-21 ENCOUNTER — NON-APPOINTMENT (OUTPATIENT)
Age: 78
End: 2023-04-21

## 2023-04-21 ENCOUNTER — APPOINTMENT (OUTPATIENT)
Dept: CARDIOLOGY | Facility: CLINIC | Age: 78
End: 2023-04-21

## 2023-04-21 ENCOUNTER — APPOINTMENT (OUTPATIENT)
Dept: CARDIOLOGY | Facility: CLINIC | Age: 78
End: 2023-04-21
Payer: MEDICARE

## 2023-04-21 VITALS — SYSTOLIC BLOOD PRESSURE: 144 MMHG | DIASTOLIC BLOOD PRESSURE: 80 MMHG

## 2023-04-21 PROCEDURE — 93000 ELECTROCARDIOGRAM COMPLETE: CPT

## 2023-04-21 PROCEDURE — 99214 OFFICE O/P EST MOD 30 MIN: CPT

## 2023-04-21 NOTE — ASSESSMENT
[FreeTextEntry1] : DiscEKG shows normal sinus rhythm at a rate of 63; complete left bundle branch block pattern. Essentially unchanged;\par \par carotid duplex study from December 8, 2021 shows minimal calcified plaque on the left with mild heterogeneous plaquing on the right side was otherwise normal flow bilaterally;\par \par \par In summary this 77-year-old gentleman with  history of LBBB on EKG \par He presented today with a chest pain like syndrome over the past several weeks for which she became anxious last night and thought his blood pressure was also out of control;\par Symptoms have some atypical features to it but cannot rule out ischemia.;\par Baseline EKG is abnormal with LBBB–unchanged;\par \par

## 2023-04-21 NOTE — REASON FOR VISIT
[FreeTextEntry1] : JARED DOMINGUEZ is being seen for arrhythmia/ECG abnormalities, structural heart and valve disease, hyperlipidemia and hypertension. Patient accompanied by spouse. \par \par The patient is a 77-year-old white male retired  who presents back to the office urgently because of some complaints of recent upper left chest pain discomfort he has been experiencing;\par The symptoms feel like a slight pressure-like feeling in the upper left chest sometimes radiating into the left upper axilla area;\par He reports this has been going on and off for about a month and frequently symptoms are at rest and not necessarily with exertion; he does not recall any particular injury but is always doing physical chores and lifting around the house etc..  He also thought his blood pressure was "out of control" but then realized his machine may have been broken at home;\par \par \par He has a history of mild hypertension and hyperlipidemia, as well as abnormal EKG (LBBB);\par \par He denies any significant shortness of breath, palpitations, dizziness or syncope;

## 2023-04-21 NOTE — PHYSICAL EXAM
[Well Developed] : well developed [Well Nourished] : well nourished [No Acute Distress] : no acute distress [Obese] : obese [Normal Conjunctiva] : normal conjunctiva [Normal Venous Pressure] : normal venous pressure [No Carotid Bruit] : no carotid bruit [Normal S1, S2] : normal S1, S2 [No Murmur] : no murmur [No Rub] : no rub [No Gallop] : no gallop [Murmur] : murmur [Clear Lung Fields] : clear lung fields [Good Air Entry] : good air entry [No Respiratory Distress] : no respiratory distress  [Soft] : abdomen soft [Non Tender] : non-tender [No Masses/organomegaly] : no masses/organomegaly [Normal Bowel Sounds] : normal bowel sounds [Normal Gait] : normal gait [No Edema] : no edema [No Clubbing] : no clubbing [No Cyanosis] : no cyanosis [No Varicosities] : no varicosities [No Rash] : no rash [No Skin Lesions] : no skin lesions [Moves all extremities] : moves all extremities [No Focal Deficits] : no focal deficits [Normal Speech] : normal speech [Alert and Oriented] : alert and oriented [Normal memory] : normal memory [de-identified] : Grade I/VI systolic murmur

## 2023-04-21 NOTE — DISCUSSION/SUMMARY
[FreeTextEntry1] : Plan\par \par Have recommended that if atypical chest symptoms worsen he should present to the ER and have additional evaluation and cardiac enzymes;\par \par Otherwise, recommend repeating nuclear stress test with pharmacologic protocol in the very near future;\par \par Patient recommended to limit physical exertion activities until stress test can be completed;\par \par Continue current medical regimen and add enteric-coated aspirin 81 mg daily;\par \par Return to office within 6-8 week or as needed;

## 2023-04-28 ENCOUNTER — APPOINTMENT (OUTPATIENT)
Dept: CARDIOLOGY | Facility: CLINIC | Age: 78
End: 2023-04-28
Payer: MEDICARE

## 2023-04-28 PROCEDURE — A9500: CPT

## 2023-04-28 PROCEDURE — 78452 HT MUSCLE IMAGE SPECT MULT: CPT

## 2023-04-28 PROCEDURE — 93015 CV STRESS TEST SUPVJ I&R: CPT

## 2023-05-16 ENCOUNTER — NON-APPOINTMENT (OUTPATIENT)
Age: 78
End: 2023-05-16

## 2023-05-16 ENCOUNTER — APPOINTMENT (OUTPATIENT)
Dept: CARDIOLOGY | Facility: CLINIC | Age: 78
End: 2023-05-16
Payer: MEDICARE

## 2023-05-16 VITALS
DIASTOLIC BLOOD PRESSURE: 78 MMHG | SYSTOLIC BLOOD PRESSURE: 142 MMHG | RESPIRATION RATE: 16 BRPM | BODY MASS INDEX: 30.82 KG/M2 | HEIGHT: 65 IN | WEIGHT: 185 LBS | HEART RATE: 66 BPM

## 2023-05-16 DIAGNOSIS — R06.02 SHORTNESS OF BREATH: ICD-10-CM

## 2023-05-16 PROCEDURE — 93000 ELECTROCARDIOGRAM COMPLETE: CPT

## 2023-05-16 PROCEDURE — 99214 OFFICE O/P EST MOD 30 MIN: CPT

## 2023-05-16 RX ORDER — ASCORBIC ACID 500 MG
TABLET ORAL
Refills: 0 | Status: DISCONTINUED | COMMUNITY
End: 2023-05-16

## 2023-05-16 RX ORDER — LOSARTAN POTASSIUM 50 MG/1
50 TABLET, FILM COATED ORAL DAILY
Qty: 90 | Refills: 0 | Status: ACTIVE | COMMUNITY
Start: 2020-11-21

## 2023-05-16 NOTE — REASON FOR VISIT
[FreeTextEntry1] : JARED DOMINGUEZ is being seen for arrhythmia/ECG abnormalities, structural heart and valve disease, hyperlipidemia and hypertension. Patient accompanied by spouse. \par \par The patient is a 77-year-old white male retired  who presented  to the office urgently about 1 month ago because of some complaints of recent upper left chest pain discomfort he had been experiencing;\par The symptoms felt like a slight pressure-like feeling in the upper left chest sometimes radiating into the left upper axilla area;\par He reported this had been going on and off for about a month and frequently symptoms had been at rest and not necessarily with exertion; he did not recall any particular injury but is always doing physical chores and lifting around the house etc.. He also thought his blood pressure was "out of control" but then realized his machine may have been broken at home;\par \par He has a history of mild hypertension and hyperlipidemia, as well as abnormal EKG (LBBB);\par \par Since then, his symptoms appear to have dissipated spontaneously and he now feels it could have been musculoskeletal in nature.  He denies any significant exertional CP, shortness of breath, palpitations, dizziness or syncope;

## 2023-05-16 NOTE — ASSESSMENT
[FreeTextEntry1] : EKG shows normal sinus rhythm at a rate of 66; complete left bundle branch block pattern; left axis deviation. Essentially unchanged;\par \par carotid duplex study from December 8, 2021 shows minimal calcified plaque on the left with mild heterogeneous plaquing on the right side was otherwise normal flow bilaterally;\par \par \par In summary this 77-year-old gentleman with history of LBBB on EKG \par He presented last month with a chest pain like syndrome over the past several weeks for which he became anxious the night before and thought his blood pressure was also out of control;\par Symptoms had some atypical features to it but cannot rule out ischemia.;\par Baseline EKG is abnormal with LBBB–unchanged;\par \par Recommended to either go to emergency department to be fully evaluated or to have nuclear stress testing performed.  Patient declined ED and completed nuclear stress test which was negative for significant ischemia; \par Took it upon himself to cut his Losartan 100 mg in 1/2 with 50 mg daily.  Blood pressures have been well controlled;\par Patient now asymptomatic and feeling pretty good;\par \par \par \par Plan\par \par Patient reassured his nuclear stress testing failed to demonstrate any significant coronary ischemia and his prior symptoms appear to have abated spontaneously;\par \par Blood pressures are now well controlled despite patient cutting his Losartan in 1/2.  Continue Losartan 50 mg daily and checking BP at home periodically.  Contact our office if blood pressure begins to elevated;\par \par Recommend he complete a Transthoracic Echocardiogram prior to follow up to assess overall cardiac function, assess for signs of cardiac remodelling and valvulopathy;\par \par Recommend patient report any untoward symptoms;\par \par Return to office with Dr. Braun on July 19th or as needed.  Will consider cardiac catheterization if patients symptoms return or worsen.

## 2023-05-16 NOTE — HISTORY OF PRESENT ILLNESS
[FreeTextEntry1] : He took it upon himself to cut his Losartan 100 mg in 1/2 with taking 50 mg daily.  He felt his blood pressures were running too low and was causing lightheadedness as well.  At-home blood pressure has been averaging in the 120s to 130s over 60s to 70s since reducing his Losartan;\par \par Brought his home blood pressure monitor in today for calibration and appears to be very similar to our manual measurements;\par \par Most recent Pharmacologic Nuclear Stress Test (04/28/2023):  Patient developed atypical chest pain and dizziness during stress exam which resolved with IV Aminophylline.  Patient developed frequent PVCs during exercise. The EKG was non-diagnostic for ischemia.  Myocardial perfusion imaging was normal with artifact.  LV function was normal with LVEF of 68%; ie- negative study; \par \par Transthoracic echocardiogram from November 2020 showed preserved LV size and systolic function with normal ejection fraction 60%. Mild AI and TR;

## 2023-05-16 NOTE — PHYSICAL EXAM
[Well Developed] : well developed [No Acute Distress] : no acute distress [Obese] : obese [Normal Conjunctiva] : normal conjunctiva [Normal Venous Pressure] : normal venous pressure [No Carotid Bruit] : no carotid bruit [Normal S1, S2] : normal S1, S2 [No Rub] : no rub [No Gallop] : no gallop [Murmur] : murmur [Clear Lung Fields] : clear lung fields [Good Air Entry] : good air entry [No Respiratory Distress] : no respiratory distress  [Soft] : abdomen soft [Non Tender] : non-tender [No Masses/organomegaly] : no masses/organomegaly [Normal Gait] : normal gait [No Edema] : no edema [No Cyanosis] : no cyanosis [No Clubbing] : no clubbing [No Rash] : no rash [No Skin Lesions] : no skin lesions [Moves all extremities] : moves all extremities [No Focal Deficits] : no focal deficits [Normal Speech] : normal speech [Alert and Oriented] : alert and oriented [Normal memory] : normal memory [de-identified] : Grade I/VI systolic murmur

## 2023-06-17 NOTE — ASU DISCHARGE PLAN (ADULT/PEDIATRIC). - MODE OF TRANSPORTATION
Patients ( [facility]) tele  monitoring box encountering issues  Tele box exchanged   New box number is [id number]  Tele room contacted and informed of exchange   Spoke with Kirk.    Wheelchair/Stroller

## 2023-07-09 ENCOUNTER — NON-APPOINTMENT (OUTPATIENT)
Age: 78
End: 2023-07-09

## 2023-07-12 ENCOUNTER — APPOINTMENT (OUTPATIENT)
Dept: CARDIOLOGY | Facility: CLINIC | Age: 78
End: 2023-07-12
Payer: MEDICARE

## 2023-07-12 PROCEDURE — 93306 TTE W/DOPPLER COMPLETE: CPT

## 2023-07-19 ENCOUNTER — APPOINTMENT (OUTPATIENT)
Dept: CARDIOLOGY | Facility: CLINIC | Age: 78
End: 2023-07-19
Payer: MEDICARE

## 2023-07-19 ENCOUNTER — NON-APPOINTMENT (OUTPATIENT)
Age: 78
End: 2023-07-19

## 2023-07-19 VITALS
SYSTOLIC BLOOD PRESSURE: 130 MMHG | DIASTOLIC BLOOD PRESSURE: 70 MMHG | HEART RATE: 69 BPM | RESPIRATION RATE: 16 BRPM | BODY MASS INDEX: 30.32 KG/M2 | HEIGHT: 65 IN | WEIGHT: 182 LBS

## 2023-07-19 DIAGNOSIS — I10 ESSENTIAL (PRIMARY) HYPERTENSION: ICD-10-CM

## 2023-07-19 DIAGNOSIS — R07.9 CHEST PAIN, UNSPECIFIED: ICD-10-CM

## 2023-07-19 DIAGNOSIS — R01.1 CARDIAC MURMUR, UNSPECIFIED: ICD-10-CM

## 2023-07-19 PROCEDURE — 99214 OFFICE O/P EST MOD 30 MIN: CPT

## 2023-07-19 PROCEDURE — 93000 ELECTROCARDIOGRAM COMPLETE: CPT

## 2023-07-21 NOTE — ASSESSMENT
[FreeTextEntry1] : EKG shows normal sinus rhythm at a rate of 69; complete left bundle branch block pattern; left axis deviation. Essentially unchanged;\par \par carotid duplex study from December 8, 2021 shows minimal calcified plaque on the left with mild heterogeneous plaquing on the right side was otherwise normal flow bilaterally;\par \par \par In summary this 77-year-old gentleman with history of LBBB on EKG \par He presented last month with a chest pain like syndrome over the past several weeks for which he became anxious the night before and thought his blood pressure was also out of control;\par Overall, symptoms have significantly improved if not abated;\par \par Last nuclear stress test was negative for ischemia on the myocardial perfusion images;\par Presently cardiac and hemodynamically stable;\par \par Plan\par \par Patient reassured his nuclear stress testing failed to demonstrate any significant coronary ischemia and his prior symptoms appear to have abated spontaneously;\par \par Blood pressures are now well controlled despite patient cutting his Losartan in 1/2.  Continue Losartan 50 mg daily and checking BP at home periodically.  Contact our office if blood pressure begins to elevated;\par \par Recommend patient report any untoward symptoms;\par \par Follow-up within 5 months or as needed;

## 2023-07-21 NOTE — HISTORY OF PRESENT ILLNESS
[FreeTextEntry1] : He took it upon himself to cut his Losartan 100 mg in 1/2 with taking 50 mg daily.  He felt his blood pressures were running too low and was causing lightheadedness as well.  At-home blood pressure has been averaging in the 120s to 130s over 60s to 70s since reducing his Losartan;\par \par Brought his home blood pressure monitor in today for calibration and appears to be very similar to our manual measurements;\par \par Transthoracic echocardiogram from July 12, 2023 shows normal size left ventricle with normal systolic function with paradoxical septal motion likely underlying bundle branch block pattern EF 55 to 60%.  There was trace MR and TR and PI.  No pericardial effusion;\par \par \par Recent Pharmacologic Nuclear Stress Test (04/28/2023):  Patient developed atypical chest pain and dizziness during stress exam which resolved with IV Aminophylline.  Patient developed frequent PVCs during exercise. The EKG was non-diagnostic for ischemia.  Myocardial perfusion imaging was normal with artifact.  LV function was normal with LVEF of 68%; ie- negative study; \par \par

## 2023-07-21 NOTE — PHYSICAL EXAM
[Well Developed] : well developed [No Acute Distress] : no acute distress [Obese] : obese [Normal Conjunctiva] : normal conjunctiva [Normal Venous Pressure] : normal venous pressure [No Carotid Bruit] : no carotid bruit [Normal S1, S2] : normal S1, S2 [No Rub] : no rub [No Gallop] : no gallop [Murmur] : murmur [Clear Lung Fields] : clear lung fields [Good Air Entry] : good air entry [No Respiratory Distress] : no respiratory distress  [Soft] : abdomen soft [Non Tender] : non-tender [No Masses/organomegaly] : no masses/organomegaly [Normal Gait] : normal gait [No Edema] : no edema [No Cyanosis] : no cyanosis [No Clubbing] : no clubbing [No Rash] : no rash [No Skin Lesions] : no skin lesions [Moves all extremities] : moves all extremities [No Focal Deficits] : no focal deficits [Normal Speech] : normal speech [Alert and Oriented] : alert and oriented [Normal memory] : normal memory [de-identified] : Grade I/VI systolic murmur

## 2023-09-07 RX ORDER — KIT FOR THE PREPARATION OF TECHNETIUM TC99M SESTAMIBI 1 MG/5ML
INJECTION, POWDER, LYOPHILIZED, FOR SOLUTION PARENTERAL
Refills: 0 | Status: COMPLETED | OUTPATIENT
Start: 2023-09-07

## 2023-09-07 RX ADMIN — KIT FOR THE PREPARATION OF TECHNETIUM TC99M SESTAMIBI 0: 1 INJECTION, POWDER, LYOPHILIZED, FOR SOLUTION PARENTERAL at 00:00

## 2024-04-29 ENCOUNTER — APPOINTMENT (OUTPATIENT)
Dept: CARDIOLOGY | Facility: CLINIC | Age: 79
End: 2024-04-29
Payer: MEDICARE

## 2024-04-29 ENCOUNTER — NON-APPOINTMENT (OUTPATIENT)
Age: 79
End: 2024-04-29

## 2024-04-29 VITALS
HEART RATE: 71 BPM | BODY MASS INDEX: 30.32 KG/M2 | RESPIRATION RATE: 16 BRPM | HEIGHT: 65 IN | SYSTOLIC BLOOD PRESSURE: 118 MMHG | DIASTOLIC BLOOD PRESSURE: 62 MMHG | WEIGHT: 182 LBS

## 2024-04-29 DIAGNOSIS — I44.7 LEFT BUNDLE-BRANCH BLOCK, UNSPECIFIED: ICD-10-CM

## 2024-04-29 DIAGNOSIS — R94.31 ABNORMAL ELECTROCARDIOGRAM [ECG] [EKG]: ICD-10-CM

## 2024-04-29 DIAGNOSIS — R10.10 UPPER ABDOMINAL PAIN, UNSPECIFIED: ICD-10-CM

## 2024-04-29 PROCEDURE — 99214 OFFICE O/P EST MOD 30 MIN: CPT

## 2024-04-29 PROCEDURE — 93000 ELECTROCARDIOGRAM COMPLETE: CPT

## 2024-04-29 PROCEDURE — G2211 COMPLEX E/M VISIT ADD ON: CPT

## 2024-04-29 NOTE — REVIEW OF SYSTEMS
[Chest Discomfort] : chest discomfort [Abdominal Pain] : abdominal pain [Negative] : Heme/Lymph [FreeTextEntry5] : Some atypical lower sternal epigastric chest discomfort;

## 2024-04-29 NOTE — PHYSICAL EXAM
[Well Developed] : well developed [No Acute Distress] : no acute distress [Obese] : obese [Normal Conjunctiva] : normal conjunctiva [Normal Venous Pressure] : normal venous pressure [No Carotid Bruit] : no carotid bruit [Normal S1, S2] : normal S1, S2 [No Rub] : no rub [No Gallop] : no gallop [Murmur] : murmur [Clear Lung Fields] : clear lung fields [Good Air Entry] : good air entry [No Respiratory Distress] : no respiratory distress  [Soft] : abdomen soft [Non Tender] : non-tender [No Masses/organomegaly] : no masses/organomegaly [Normal Gait] : normal gait [No Edema] : no edema [No Cyanosis] : no cyanosis [No Clubbing] : no clubbing [No Rash] : no rash [No Skin Lesions] : no skin lesions [Moves all extremities] : moves all extremities [No Focal Deficits] : no focal deficits [Normal Speech] : normal speech [Alert and Oriented] : alert and oriented [Normal memory] : normal memory [de-identified] : Grade I/VI systolic murmur

## 2024-04-29 NOTE — ASSESSMENT
[FreeTextEntry1] : EKG shows normal sinus rhythm at a rate of 71; complete left bundle branch block pattern; left axis deviation. Essentially unchanged;   In summary this 78-year-old gentleman with history of LBBB on EKG  He had had a stable cardiac pattern and blood pressure seem to be under reasonably good control on current medication including today.  However, patient reports having almost 2 days of uncomfortable epigastric lower sternal chest discomfort which she finds difficult to describe and thinks it may have gone through towards the back as well and then abruptly spontaneously improved;  Last nuclear stress test was negative for ischemia on the myocardial perfusion images; Presently cardiac and hemodynamically stable;  Plan  Patient recommended to undergo abdominal aortic sonogram to rule out any evidence of abdominal aortic aneurysm;  Consider GI workup if symptoms recur but could simply have been costochondritis of the chest wall or pulled muscle in that area from lifting heavy objects recently;  Blood pressures are now well controlled despite patient cutting his Losartan in 1/2.  Continue Losartan 50 mg daily and checking BP at home periodically.  Contact our office if blood pressure begins to elevated;  Recommend patient report any untoward symptoms;  Follow-up within 4- 5 months or as needed;

## 2024-04-29 NOTE — HISTORY OF PRESENT ILLNESS
[FreeTextEntry1] : Today, patient states he is feeling better and denies any significant chest pain, shortness of breath, palpitations or dizziness;   Brought his home blood pressure monitor in today for calibration and appears to be very similar to our manual measurements;  Transthoracic echocardiogram from July 12, 2023 shows normal size left ventricle with normal systolic function with paradoxical septal motion likely underlying bundle branch block pattern EF 55 to 60%.  There was trace MR and TR and PI.  No pericardial effusion;   Recent Pharmacologic Nuclear Stress Test (04/28/2023):  Patient developed atypical chest pain and dizziness during stress exam which resolved with IV Aminophylline.  Patient developed frequent PVCs during exercise. The EKG was non-diagnostic for ischemia.  Myocardial perfusion imaging was normal with artifact.  LV function was normal with LVEF of 68%; ie- negative study;

## 2024-04-29 NOTE — REASON FOR VISIT
[FreeTextEntry1] : JARED DOMINGUEZ is being seen for arrhythmia/ECG abnormalities, structural heart and valve disease, hyperlipidemia and hypertension. Patient accompanied by spouse.   The patient is a 78-year-old white male retired  who has a history for abnormal EKG (LBBB), hypertension, and hyperlipidemia as well as obesity ; He has been evaluated in the past for history of chest pain syndrome and had a workup including a nuclear stress test with pharmacologic protocol April 28, 2023 and was negative for ischemia on the myocardial perfusion images;  Patient has been doing reasonably well on his current medical regimen and blood pressure has been under fairly good control however he developed an episode of upper abdominal epigastric like pain discomfort lasting from 1 to 2 days this past weekend which seem to have some change in position change in the symptoms.  There was no nausea vomiting or diarrhea but symptoms seem to pass from the epigastrium to the mid back for a while he said; then today, he states it spontaneously improved;  On further questioning patient reports that he was lifting and moving furniture and helping out some family members a couple of days before;

## 2024-05-06 ENCOUNTER — APPOINTMENT (OUTPATIENT)
Dept: CARDIOLOGY | Facility: CLINIC | Age: 79
End: 2024-05-06

## 2024-05-23 ENCOUNTER — APPOINTMENT (OUTPATIENT)
Dept: CARDIOLOGY | Facility: CLINIC | Age: 79
End: 2024-05-23

## 2024-05-23 PROCEDURE — 93978 VASCULAR STUDY: CPT

## 2024-10-14 ENCOUNTER — APPOINTMENT (OUTPATIENT)
Dept: CARDIOLOGY | Facility: CLINIC | Age: 79
End: 2024-10-14
Payer: MEDICARE

## 2024-10-14 ENCOUNTER — NON-APPOINTMENT (OUTPATIENT)
Age: 79
End: 2024-10-14

## 2024-10-14 VITALS
HEART RATE: 73 BPM | BODY MASS INDEX: 27.99 KG/M2 | HEIGHT: 65 IN | DIASTOLIC BLOOD PRESSURE: 60 MMHG | RESPIRATION RATE: 16 BRPM | WEIGHT: 168 LBS | SYSTOLIC BLOOD PRESSURE: 140 MMHG

## 2024-10-14 DIAGNOSIS — R06.02 SHORTNESS OF BREATH: ICD-10-CM

## 2024-10-14 DIAGNOSIS — I44.7 LEFT BUNDLE-BRANCH BLOCK, UNSPECIFIED: ICD-10-CM

## 2024-10-14 DIAGNOSIS — M25.472 EFFUSION, LEFT ANKLE: ICD-10-CM

## 2024-10-14 DIAGNOSIS — I10 ESSENTIAL (PRIMARY) HYPERTENSION: ICD-10-CM

## 2024-10-14 DIAGNOSIS — R94.31 ABNORMAL ELECTROCARDIOGRAM [ECG] [EKG]: ICD-10-CM

## 2024-10-14 PROCEDURE — 93000 ELECTROCARDIOGRAM COMPLETE: CPT

## 2024-10-14 PROCEDURE — G2211 COMPLEX E/M VISIT ADD ON: CPT

## 2024-10-14 PROCEDURE — 99214 OFFICE O/P EST MOD 30 MIN: CPT

## 2025-03-06 ENCOUNTER — NON-APPOINTMENT (OUTPATIENT)
Age: 80
End: 2025-03-06

## 2025-03-10 ENCOUNTER — NON-APPOINTMENT (OUTPATIENT)
Age: 80
End: 2025-03-10

## 2025-03-11 ENCOUNTER — NON-APPOINTMENT (OUTPATIENT)
Age: 80
End: 2025-03-11

## 2025-03-11 ENCOUNTER — APPOINTMENT (OUTPATIENT)
Dept: CARDIOLOGY | Facility: CLINIC | Age: 80
End: 2025-03-11
Payer: MEDICARE

## 2025-03-11 VITALS
DIASTOLIC BLOOD PRESSURE: 76 MMHG | BODY MASS INDEX: 23.82 KG/M2 | RESPIRATION RATE: 16 BRPM | SYSTOLIC BLOOD PRESSURE: 142 MMHG | HEIGHT: 65 IN | WEIGHT: 143 LBS | HEART RATE: 64 BPM

## 2025-03-11 DIAGNOSIS — I10 ESSENTIAL (PRIMARY) HYPERTENSION: ICD-10-CM

## 2025-03-11 DIAGNOSIS — I44.7 LEFT BUNDLE-BRANCH BLOCK, UNSPECIFIED: ICD-10-CM

## 2025-03-11 DIAGNOSIS — Z01.818 ENCOUNTER FOR OTHER PREPROCEDURAL EXAMINATION: ICD-10-CM

## 2025-03-11 DIAGNOSIS — R94.31 ABNORMAL ELECTROCARDIOGRAM [ECG] [EKG]: ICD-10-CM

## 2025-03-11 DIAGNOSIS — R01.1 CARDIAC MURMUR, UNSPECIFIED: ICD-10-CM

## 2025-03-11 PROCEDURE — 99214 OFFICE O/P EST MOD 30 MIN: CPT

## 2025-03-11 PROCEDURE — 93000 ELECTROCARDIOGRAM COMPLETE: CPT

## 2025-07-15 ENCOUNTER — APPOINTMENT (OUTPATIENT)
Dept: CARDIOLOGY | Facility: CLINIC | Age: 80
End: 2025-07-15
Payer: MEDICARE

## 2025-07-15 VITALS
RESPIRATION RATE: 16 BRPM | DIASTOLIC BLOOD PRESSURE: 60 MMHG | BODY MASS INDEX: 22.82 KG/M2 | SYSTOLIC BLOOD PRESSURE: 120 MMHG | HEART RATE: 60 BPM | HEIGHT: 65 IN | WEIGHT: 137 LBS

## 2025-07-15 PROCEDURE — 99214 OFFICE O/P EST MOD 30 MIN: CPT | Mod: 25

## 2025-07-15 PROCEDURE — 93000 ELECTROCARDIOGRAM COMPLETE: CPT

## (undated) DEVICE — DRSG PAD EYE OVAL

## (undated) DEVICE — DRSG TEGADERM 2.5X3"

## (undated) DEVICE — Device

## (undated) DEVICE — BLADE 15 DEGREE

## (undated) DEVICE — SUT NYLON 10-0 6" CU-5

## (undated) DEVICE — BLANKET WARMER LOWER ADULT

## (undated) DEVICE — WRAP COMPRESSION CALF MED

## (undated) DEVICE — SHIELD CORNL CLLGN 12HR

## (undated) DEVICE — DRAPE HALF SHEET 40X57"

## (undated) DEVICE — GLV 6.5 PROTEXIS

## (undated) DEVICE — DRAPE LIGHT HANDLE COVER FLEX GREEN

## (undated) DEVICE — PACK OPTH CATARACT

## (undated) DEVICE — DRAPE MICROSCOPE OPMI VISIONGUARD 48X118"